# Patient Record
Sex: FEMALE | Race: WHITE | Employment: UNEMPLOYED | URBAN - METROPOLITAN AREA
[De-identification: names, ages, dates, MRNs, and addresses within clinical notes are randomized per-mention and may not be internally consistent; named-entity substitution may affect disease eponyms.]

---
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
## 2022-02-20 ENCOUNTER — APPOINTMENT (OUTPATIENT)
Dept: GENERAL RADIOLOGY | Age: 22
End: 2022-02-20
Payer: MEDICAID

## 2022-02-20 ENCOUNTER — HOSPITAL ENCOUNTER (EMERGENCY)
Age: 22
Discharge: LEFT BEFORE DISCHARGE | End: 2022-02-20
Payer: MEDICAID

## 2022-02-20 VITALS
BODY MASS INDEX: 19.87 KG/M2 | WEIGHT: 125 LBS | TEMPERATURE: 98 F | HEART RATE: 70 BPM | SYSTOLIC BLOOD PRESSURE: 116 MMHG | DIASTOLIC BLOOD PRESSURE: 61 MMHG | RESPIRATION RATE: 18 BRPM | OXYGEN SATURATION: 98 %

## 2022-02-20 DIAGNOSIS — Z53.21 ELOPED FROM EMERGENCY DEPARTMENT: Primary | ICD-10-CM

## 2022-02-20 PROCEDURE — 4500000002 HC ER NO CHARGE

## 2022-02-20 PROCEDURE — 73130 X-RAY EXAM OF HAND: CPT

## 2022-02-20 NOTE — ED PROVIDER NOTES
I attempted to evaluate the patient and the first time she was in xray. I returned and was informed she eloped at [phone].       Osiel Thorpe, CAROL - CNP  02/20/22 0716

## 2022-02-21 NOTE — ED NOTES
This RN told by registration that pt had left ER. Pt taken off of ER tracking board.       Enrrique Balderrama RN  02/20/22 5993